# Patient Record
Sex: FEMALE | Race: WHITE
[De-identification: names, ages, dates, MRNs, and addresses within clinical notes are randomized per-mention and may not be internally consistent; named-entity substitution may affect disease eponyms.]

---

## 2022-05-29 ENCOUNTER — HOSPITAL ENCOUNTER (EMERGENCY)
Dept: HOSPITAL 76 - ED | Age: 47
Discharge: HOME | End: 2022-05-29
Payer: COMMERCIAL

## 2022-05-29 VITALS — SYSTOLIC BLOOD PRESSURE: 147 MMHG | DIASTOLIC BLOOD PRESSURE: 85 MMHG

## 2022-05-29 DIAGNOSIS — T19.2XXA: Primary | ICD-10-CM

## 2022-05-29 PROCEDURE — 99282 EMERGENCY DEPT VISIT SF MDM: CPT

## 2022-05-29 PROCEDURE — 99281 EMR DPT VST MAYX REQ PHY/QHP: CPT

## 2022-05-29 NOTE — ED PHYSICIAN DOCUMENTATION
History of Present Illness





- Stated complaint


Stated Complaint: FEMALE 





- Chief complaint


Chief Complaint: General





- Additonal information


Additional information: 





46-year-old female presents emergency department for evaluation of a tampon in 

her vagina.  She had consensual sexual intercourse with her partner this morning

but forgot that a tampon was in.  She has been unable to remove it.  No fevers, 

denies pain.  No concern of STI.  Sexually monogamous long-term relationship.








Review of Systems


Constitutional: reports: Reviewed and negative


Nose: reports: Reviewed and negative


Cardiac: reports: Reviewed and negative


Respiratory: reports: Reviewed and negative


: reports: Other (Tampon in vaginal vault)


Skin: reports: Reviewed and negative


Musculoskeletal: reports: Reviewed and negative





PD PAST MEDICAL HISTORY





- Present Medications


Home Medications: 


                                Ambulatory Orders











 Medication  Instructions  Recorded  Confirmed


 


Norgestimate-Ethinyl Estradiol 1 tab PO DAILY 05/29/22 05/29/22





[Tri-Sprintec Tablet]   














- Allergies


Allergies/Adverse Reactions: 


                                    Allergies











Allergy/AdvReac Type Severity Reaction Status Date / Time


 


No Known Drug Allergies Allergy   Verified 05/29/22 10:19














PD ED PE EXPANDED





- General


General: Alert, No acute distress, Well developed/nourished





- Female 


Female : Chaperone present, Other (Tampon is seen lodged distal to the cervix.

 It was easily retrieved with Balta forceps intact.  Brief evaluation of the 

cervix revealed no friability discharge or CMT/adnexal tenderness)





Results





- Vitals


Vitals: 





                               Vital Signs - 24 hr











  05/29/22





  10:20


 


Temperature 36.7 C


 


Heart Rate 70


 


Respiratory 18





Rate 


 


Blood Pressure 147/85 H


 


O2 Saturation 99








                                     Oxygen











O2 Source                      Room air

















PD MEDICAL DECISION MAKING





- ED course


Complexity details: d/w patient


ED course: 





46-year-old female presents emergency department to have a tampon removed from 

her vagina.  She had sexual intercourse this morning and forgot that it was in. 

We are able to easily remove the tampon using Balta forceps.  Brief view of the

cervix post removal showed no friability or unexpected tenderness or drainage.  

Routine care and emergent return precautions discussed





Departure





- Departure


Disposition: 01 Home, Self Care


Clinical Impression: 


Foreign body in vagina


Qualifiers:


 Encounter type: initial encounter Qualified Code(s): T19.2XXA - Foreign body in

vulva and vagina, initial encounter





Condition: Stable


Record reviewed to determine appropriate education?: Yes


Comments: 


Maura we were able to remove the tampon.  I do not expect you to have any 

significant side effects.  You may feel somewhat sore and uncomfortable for the 

next day or 2 but this should resolve.  Tylenol or ibuprofen may be helpful.  Do

not douche.  If at any point you develop fevers, have severe pelvic pain 

uncontrolled vomiting then please return to the ER for a second evaluation